# Patient Record
Sex: MALE | Race: WHITE | NOT HISPANIC OR LATINO | URBAN - METROPOLITAN AREA
[De-identification: names, ages, dates, MRNs, and addresses within clinical notes are randomized per-mention and may not be internally consistent; named-entity substitution may affect disease eponyms.]

---

## 2019-03-01 ENCOUNTER — OFFICE VISIT (OUTPATIENT)
Dept: URGENT CARE | Facility: CLINIC | Age: 39
End: 2019-03-01
Payer: COMMERCIAL

## 2019-03-01 VITALS
HEIGHT: 73 IN | TEMPERATURE: 101 F | DIASTOLIC BLOOD PRESSURE: 81 MMHG | SYSTOLIC BLOOD PRESSURE: 123 MMHG | HEART RATE: 88 BPM | WEIGHT: 250 LBS | BODY MASS INDEX: 33.13 KG/M2 | OXYGEN SATURATION: 99 %

## 2019-03-01 DIAGNOSIS — J11.1 INFLUENZA: ICD-10-CM

## 2019-03-01 DIAGNOSIS — R68.89 FLU-LIKE SYMPTOMS: Primary | ICD-10-CM

## 2019-03-01 DIAGNOSIS — R05.9 COUGH: ICD-10-CM

## 2019-03-01 LAB
CTP QC/QA: YES
FLUAV AG NPH QL: NEGATIVE
FLUBV AG NPH QL: NEGATIVE

## 2019-03-01 PROCEDURE — 3008F BODY MASS INDEX DOCD: CPT | Mod: CPTII,S$GLB,, | Performed by: NURSE PRACTITIONER

## 2019-03-01 PROCEDURE — 99213 PR OFFICE/OUTPT VISIT, EST, LEVL III, 20-29 MIN: ICD-10-PCS | Mod: S$GLB,,, | Performed by: NURSE PRACTITIONER

## 2019-03-01 PROCEDURE — 87804 POCT INFLUENZA A/B: ICD-10-PCS | Mod: 59,QW,S$GLB, | Performed by: NURSE PRACTITIONER

## 2019-03-01 PROCEDURE — 99213 OFFICE O/P EST LOW 20 MIN: CPT | Mod: S$GLB,,, | Performed by: NURSE PRACTITIONER

## 2019-03-01 PROCEDURE — 87804 INFLUENZA ASSAY W/OPTIC: CPT | Mod: QW,S$GLB,, | Performed by: NURSE PRACTITIONER

## 2019-03-01 PROCEDURE — 3008F PR BODY MASS INDEX (BMI) DOCUMENTED: ICD-10-PCS | Mod: CPTII,S$GLB,, | Performed by: NURSE PRACTITIONER

## 2019-03-01 RX ORDER — PREDNISONE 5 MG/1
5 TABLET ORAL DAILY
Qty: 5 TABLET | Refills: 0 | Status: SHIPPED | OUTPATIENT
Start: 2019-03-01 | End: 2019-03-06

## 2019-03-01 RX ORDER — OSELTAMIVIR PHOSPHATE 75 MG/1
75 CAPSULE ORAL 2 TIMES DAILY
Qty: 10 CAPSULE | Refills: 0 | Status: SHIPPED | OUTPATIENT
Start: 2019-03-01 | End: 2019-03-06

## 2019-03-01 NOTE — PATIENT INSTRUCTIONS
The Flu (Influenza)     The virus that causes the flu spreads through the air in droplets when someone who has the flu coughs, sneezes, laughs, or talks.   The flu (influenza) is an infection that affects your respiratory tract. This tract is made up of your mouth, nose, and lungs, and the passages between them. Unlike a cold, the flu can make you very ill. And it can lead to pneumonia, a serious lung infection. The flu can have serious complications and even cause death.  Who is at risk for the flu?  Anyone can get the flu. But you are more likely to become infected if you:  · Have a weakened immune system  · Work in a healthcare setting where you may be exposed to flu germs  · Live or work with someone who has the flu  · Havent had an annual flu shot  How does the flu spread?  The flu is caused by a virus. The virus spreads through the air in droplets when someone who has the flu coughs, sneezes, laughs, or talks. You can become infected when you inhale these viruses directly. You can also become infected when you touch a surface on which the droplets have landed and then transfer the germs to your eyes, nose, or mouth. Touching used tissues, or sharing utensils, drinking glasses, or a toothbrush from an infected person can expose you to flu viruses, too.  What are the symptoms of the flu?  Flu symptoms tend to come on quickly and may last a few days to a few weeks. They include:  · Fever usually higher than 100.4°F  (38°C) and chills  · Sore throat and headache  · Dry cough  · Runny nose  · Tiredness and weakness  · Muscle aches  Who is at risk for flu complications?  For some people, the flu can be very serious. The risk for complications is greater for:  · Children younger than age 5  · Adults ages 65 and older  · People with a chronic illness such as diabetes or heart, kidney, or lung disease  · People who live in a nursing home or long-term care facility   How is the flu treated?  The flu usually gets  better after 7 days or so. In some cases, your healthcare provider may prescribe an antiviral medicine. This may help you get well a little sooner. For the medicine to help, you need to take it as soon as possible (ideally within 48 hours) after your symptoms start. If you develop pneumonia or other serious illness, you may need to stay in the hospital.  Easing flu symptoms  · Drink lots of fluids such as water, juice, and warm soup. A good rule is to drink enough so that you urinate your normal amount.  · Get plenty of rest.  · Ask your healthcare provider what to take for fever and pain.  · Call your provider if your fever is 100.4°F (38°C) or higher, or you become dizzy, lightheaded, or short of breath.  Taking steps to protect others  · Wash your hands often, especially after coughing or sneezing. Or clean your hands with an alcohol-based hand  containing at least 60% alcohol.  · Cough or sneeze into a tissue. Then throw the tissue away and wash your hands. If you dont have a tissue, cough and sneeze into your elbow.  · Stay home until at least 24 hours after you no longer have a fever or chills. Be sure the fever isnt being hidden by fever-reducing medicine.  · Dont share food, utensils, drinking glasses, or a toothbrush with others.  · Ask your healthcare provider if others in your household should get antiviral medicine to help them avoid infection.  How can the flu be prevented?  · One of the best ways to avoid the flu is to get a flu vaccine each year. The virus that causes the flu changes from year to year. For that reason, healthcare providers recommend getting the flu vaccine each year, as soon as it's available in your area. The vaccine is given as a shot. Your healthcare provider can tell you which vaccine is right for you. A nasal spray is also available but is not recommended for the 6803-1793 flu season. The CDC says this is because the nasal spray did not seem to protect against the flu  over the last several flu seasons. In the past, it was meant for people ages 2 to 49.  · Wash your hands often. Frequent handwashing is a proven way to help prevent infection.  · Carry an alcohol-based hand gel containing at least 60% alcohol. Use it when you can't use soap and water. Then wash your hands as soon as you can.  · Avoid touching your eyes, nose, and mouth.  · At home and work, clean phones, computer keyboards, and toys often with disinfectant wipes.  · If possible, avoid close contact with others who have the flu or symptoms of the flu.  Handwashing tips  Handwashing is one of the best ways to prevent many common infections. If you are caring for or visiting someone with the flu, wash your hands each time you enter and leave the room. Follow these steps:  · Use warm water and plenty of soap. Rub your hands together well.  · Clean the whole hand, including under your nails, between your fingers, and up the wrists.  · Wash for at least 15 seconds.  · Rinse, letting the water run down your fingers, not up your wrists.  · Dry your hands well. Use a paper towel to turn off the faucet and open the door.  Using alcohol-based hand   Alcohol-based hand  are also a good choice. Use them when you can't use soap and water. Follow these steps:  · Squeeze about a tablespoon of gel into the palm of one hand.  · Rub your hands together briskly, cleaning the backs of your hands, the palms, between your fingers, and up the wrists.  · Rub until the gel is gone and your hands are completely dry.  Preventing the flu in healthcare settings  The flu is a special concern for people in hospitals and long-term care facilities. To help prevent the spread of flu, many hospitals and nursing homes take these steps:  · Healthcare providers wash their hands or use an alcohol-based hand  before and after treating each patient.  · People with the flu have private rooms and bathrooms or share a room with someone  with the same infection.  · People who are at high risk for the flu but don't have it are encouraged to get the flu and pneumonia vaccines.  · All healthcare workers are encouraged or required to get flu shots.   Date Last Reviewed: 12/1/2016 © 2000-2017 [employer]. [address] [place] [id number]. All rights reserved. This information is not intended as a substitute for professional medical care. Always follow your healthcare professional's instructions.    Please return here or go to the Emergency Department for any concerns or worsening of condition.  If you were prescribed antibiotics, please take them to completion.  If you were prescribed a narcotic medication, do not drive or operate heavy equipment or machinery while taking these medications.  Please follow up with your primary care doctor or specialist as needed.    If you  smoke, please stop smoking.  1) Motrin/advil/ibuprofen- Take Two to Three Tablets(200 mg) three Times a Day for 5 to 7 Days.  2) Mucinex D 1/2 to 1 Tablet twice a day for 5 to 7 Days.  3) Drink Hot Liquids(coffee,WATER,Tea,Hot Chocolate,or Soup) that you put in a Mug place in Microwave for 2.5 to 3 minutes CHANGE THE CUP THAT WAS USED IN THE MICROWAVE SO AS NOT TO BURN YOUR MOUTH,then sniff the steam from the cup and sip the heated liquid TEN TO TWELVE TIMES A DAY for 5 to 7 Days.  4) These 3 things will help the antibiotics and other medications work faster and will speed your recovery!

## 2019-03-01 NOTE — PROGRESS NOTES
"Subjective:       Patient ID: Pardeep Quintana is a 38 y.o. male.    Vitals:  height is 6' 1" (1.854 m) and weight is 113.4 kg (250 lb). His tympanic temperature is 100.8 °F (38.2 °C) (abnormal). His blood pressure is 123/81 and his pulse is 88. His oxygen saturation is 99%.     Chief Complaint: Sinus Problem    Sinus Problem   This is a new problem. The current episode started yesterday. The problem has been gradually worsening since onset. The maximum temperature recorded prior to his arrival was 100.4 - 100.9 F. The fever has been present for 1 to 2 days. Associated symptoms include congestion, coughing, sinus pressure, sneezing and a sore throat. Pertinent negatives include no chills, diaphoresis, ear pain or shortness of breath. Past treatments include nothing.       Constitution: Negative for chills, sweating, fatigue and fever.   HENT: Positive for congestion, sinus pressure and sore throat. Negative for ear pain, sinus pain and voice change.    Neck: Negative for painful lymph nodes.   Eyes: Negative for eye redness.   Respiratory: Positive for cough. Negative for chest tightness, sputum production, bloody sputum, COPD, shortness of breath, stridor, wheezing and asthma.    Gastrointestinal: Negative for nausea and vomiting.   Musculoskeletal: Negative for muscle ache.   Skin: Negative for rash.   Allergic/Immunologic: Positive for sneezing. Negative for seasonal allergies and asthma.   Hematologic/Lymphatic: Negative for swollen lymph nodes.       Objective:      Physical Exam   Constitutional: He is oriented to person, place, and time. He appears well-developed and well-nourished. He is cooperative.  Non-toxic appearance. He does not appear ill. No distress.   HENT:   Head: Normocephalic and atraumatic.   Right Ear: Hearing, external ear and ear canal normal. Tympanic membrane is injected. Tympanic membrane is not erythematous.   Left Ear: Hearing, tympanic membrane, external ear and ear canal normal. Tympanic " membrane is not injected and not erythematous.   Nose: Nose normal. No mucosal edema, rhinorrhea or nasal deformity. No epistaxis. Right sinus exhibits no maxillary sinus tenderness and no frontal sinus tenderness. Left sinus exhibits no maxillary sinus tenderness and no frontal sinus tenderness.   Mouth/Throat: Uvula is midline, oropharynx is clear and moist and mucous membranes are normal. No trismus in the jaw. Normal dentition. No uvula swelling. No posterior oropharyngeal erythema.   Eyes: Conjunctivae and lids are normal. No scleral icterus.   Sclera clear bilat   Neck: Trachea normal, full passive range of motion without pain and phonation normal. Neck supple.   Cardiovascular: Normal rate, regular rhythm, normal heart sounds, intact distal pulses and normal pulses.   Pulmonary/Chest: Effort normal and breath sounds normal. No respiratory distress. He has no decreased breath sounds. He has no wheezes. He has no rhonchi. He has no rales.   Abdominal: Soft. Normal appearance and bowel sounds are normal. He exhibits no distension. There is no tenderness.   Musculoskeletal: Normal range of motion. He exhibits no edema or deformity.   Neurological: He is alert and oriented to person, place, and time. He exhibits normal muscle tone. Coordination normal.   Skin: Skin is warm, dry and intact. He is not diaphoretic. No pallor.   Psychiatric: He has a normal mood and affect. His speech is normal and behavior is normal. Judgment and thought content normal. Cognition and memory are normal.   Nursing note and vitals reviewed.      Assessment:       1. Flu-like symptoms    2. Influenza    3. Cough        Plan:         Flu-like symptoms  -     POCT Influenza A/B  -     oseltamivir (TAMIFLU) 75 MG capsule; Take 1 capsule (75 mg total) by mouth 2 (two) times daily. for 5 days  Dispense: 10 capsule; Refill: 0  -     predniSONE (DELTASONE) 5 MG tablet; Take 1 tablet (5 mg total) by mouth once daily. for 5 days  Dispense: 5  tablet; Refill: 0    Influenza  -     oseltamivir (TAMIFLU) 75 MG capsule; Take 1 capsule (75 mg total) by mouth 2 (two) times daily. for 5 days  Dispense: 10 capsule; Refill: 0  -     predniSONE (DELTASONE) 5 MG tablet; Take 1 tablet (5 mg total) by mouth once daily. for 5 days  Dispense: 5 tablet; Refill: 0    Cough    take meds